# Patient Record
Sex: MALE | Race: ASIAN | NOT HISPANIC OR LATINO | Employment: FULL TIME | URBAN - METROPOLITAN AREA
[De-identification: names, ages, dates, MRNs, and addresses within clinical notes are randomized per-mention and may not be internally consistent; named-entity substitution may affect disease eponyms.]

---

## 2017-07-28 ENCOUNTER — OFFICE VISIT - HEALTHEAST (OUTPATIENT)
Dept: FAMILY MEDICINE | Facility: CLINIC | Age: 47
End: 2017-07-28

## 2017-07-28 ENCOUNTER — COMMUNICATION - HEALTHEAST (OUTPATIENT)
Dept: TELEHEALTH | Facility: CLINIC | Age: 47
End: 2017-07-28

## 2017-07-28 DIAGNOSIS — M54.50 LOW BACK PAIN: ICD-10-CM

## 2017-07-28 DIAGNOSIS — R05.9 COUGH: ICD-10-CM

## 2017-07-28 ASSESSMENT — MIFFLIN-ST. JEOR: SCORE: 1522.64

## 2018-01-05 ENCOUNTER — OFFICE VISIT - HEALTHEAST (OUTPATIENT)
Dept: FAMILY MEDICINE | Facility: CLINIC | Age: 48
End: 2018-01-05

## 2018-01-05 DIAGNOSIS — Z23 NEED FOR INFLUENZA VACCINATION: ICD-10-CM

## 2018-01-05 DIAGNOSIS — R05.3 CHRONIC COUGH: ICD-10-CM

## 2019-08-05 ENCOUNTER — OFFICE VISIT (OUTPATIENT)
Dept: FAMILY MEDICINE | Facility: CLINIC | Age: 49
End: 2019-08-05
Payer: COMMERCIAL

## 2019-08-05 VITALS
HEART RATE: 64 BPM | TEMPERATURE: 98.1 F | WEIGHT: 176 LBS | SYSTOLIC BLOOD PRESSURE: 128 MMHG | OXYGEN SATURATION: 94 % | HEIGHT: 63 IN | DIASTOLIC BLOOD PRESSURE: 84 MMHG | BODY MASS INDEX: 31.18 KG/M2 | RESPIRATION RATE: 18 BRPM

## 2019-08-05 DIAGNOSIS — Z83.3 FAMILY HISTORY OF DIABETES MELLITUS: ICD-10-CM

## 2019-08-05 DIAGNOSIS — R05.3 CHRONIC COUGH: Primary | ICD-10-CM

## 2019-08-05 LAB
FASTING?: NO
GLUCOSE SERPL-MCNC: 76 MG/DL (ref 70–125)

## 2019-08-05 SDOH — HEALTH STABILITY: MENTAL HEALTH: HOW OFTEN DO YOU HAVE A DRINK CONTAINING ALCOHOL?: NEVER

## 2019-08-05 ASSESSMENT — MIFFLIN-ST. JEOR: SCORE: 1568.46

## 2019-08-05 NOTE — PROGRESS NOTES
"[facility]         SUBJECTIVE       Felicia Greco is a 49 year old male presenting to clinic today with a chief complaint of cough. Patient has had 6 months of cough. At time he has productive sputum with white sputum sometimes the cough is very dry.  His coughing is worse after big meals.  He notes that it is worse after dinner every day.  He recently started having a small amount of emesis following big meals associated with a coughing spell.  He was seen back in January 2018 for similar symptoms.  He was given a short course of amantadine to try.  Patient only took about 10 days of the medication before discontinuing it and he has been lost to follow-up since that time.  He does have a history of latent TB for which she has been on medications for in the past.  He is new to our clinic today and we have limited records.  Overall the patient does not feel ill or short of breath.  He has had no fevers and denies any congestion-like symptoms.  He has no runny nose.    PMH, Medications and Allergies were reviewed and updated as needed.    ROS:  General: No fevers, chills  Head: Sometimes has headaches not worse than typical.  CV: No chest pain or palpitations.   Resp: No shortness of breath.  No cough. No hemoptysis.  GI: Positive vomiting after coughing spell. No Constipation, diarrhea  : No urinary pains    There is no problem list on file for this patient.      Current Outpatient Medications   Medication Sig Dispense Refill     ranitidine (ZANTAC) 150 MG tablet Take 1 tablet (150 mg) by mouth 2 times daily 120 tablet 11            OBJECTIVE:       Vitals:   Vitals:    08/05/19 1114   BP: 128/84   BP Location: Left arm   Patient Position: Sitting   Cuff Size: Adult Large   Pulse: 64   Resp: 18   Temp: 98.1  F (36.7  C)   TempSrc: Oral   SpO2: 94%   Weight: 79.8 kg (176 lb)   Height: 1.6 m (5' 3\")     BMI: Body mass index is 31.18 kg/m .    Gen:  Well nourished and in no acute distress. Dry cough on " exam.  HEENT: Extraocular movement intact.  Neck: supple without lymphadenopathy  CV:  RRR  - no murmurs noted   Pulm:  CTAB, no wheezes or crackles noted, good air entry   ABD: soft, nontender, no masses, no rebound, BS intact throughout, no hepatosplenomegaly  Extrem: no cyanosis, edema or clubbing  Psych: Euthymic           ASSESSMENT and PLAN:     Patient has very limited primary care in the past.  He notes 6 months of dry cough that is worse with eating and worse when lying flat.  Patient symptoms do sound somewhat consistent with GERD.  He was seen back by a provider in January 2018 for similar symptoms.  He was given a short course of ranitidine that he only took for 10 days.  He did not think it helped his symptoms however he did not take the medication very long.  We discussed the importance of trying to take this medication again and taking it for 1 to 2 months.  He should see us first before discontinuing the medication.  Stressed the importance of following up as he has been lost to follow-up several times in the past.  He does have a history of a positive tuberculosis skin test in the past with a normal chest x-ray.  He had received therapy per his report however we only have limited records as this is the first time we are seeing him.  We will get a chest x-ray given that there is records back from 2018 noting that the patient had similar cough at that time.  We will discuss further treatment at next follow-up visit should patient's symptoms fail to improve we will call regarding the results.    1. Chronic cough  - XR CHEST 2 VW  - ranitidine (ZANTAC) 150 MG tablet; Take 1 tablet (150 mg) by mouth 2 times daily  Dispense: 120 tablet; Refill: 11    2. Family history of diabetes mellitus: Patient is concerned about the possibility of diabetes.  Mom was diagnosed at diabetes around his age.  He denies any associated symptoms at this time however will obtain a glucose today.  I did stress the importance of  having a complete physical done with the patient as he has not had one done in the recent past.  He plans to schedule that either with his 1 month follow-up or after that visit.  - Glucose ([facility])    Return to clinic in 1-2 months for follow up. Return sooner if develops new or worsening symptoms.    Options for treatment and/or follow-up care were reviewed with the patient was actively involved in the decision making process. Patient verbalized understanding and was in agreement with the plan.    The patient was seen by and discussed with MD Daphne Evans,  PGY 2  [facility] [place]  [phone]

## 2019-08-05 NOTE — PROGRESS NOTES
Preceptor Attestation:   Patient seen, evaluated and discussed with the resident. I have verified the content of the note, which accurately reflects my assessment of the patient and the plan of care.   Supervising Physician:  Chris Gonzalez MD

## 2019-08-05 NOTE — PATIENT INSTRUCTIONS
1) Please take ranitidine 2 times daily before meals  2) come back to clinic in 1 -2 months for reevaluations  3) Have Chest Xray done  4) Will call regarding chest xray and lab results  5) Need to have a complete physical done    Thank you, Daphne Schmitt PG3

## 2019-08-05 NOTE — NURSING NOTE
Due to patient being non-English speaking/uses sign language, an  was used for this visit. Only for face-to-face interpretation by an external agency, date and length of interpretation can be found on the scanned worksheet.     name: Fernanda Remy  Agency: [employer]  Language: Ora  Telephone number: [phone]  Type of interpretation: Face-to-face, spoken

## 2019-08-09 NOTE — RESULT ENCOUNTER NOTE
Annette Simmons,    Please call the following patient with the results below. Thank you!    Annette Duarte,    I hope you're well. I wanted to communicate with you the results of the tests that we did.     Which is good news! The laboratory results show no elevation of your glucose which is reassuring that you do not have diabetes.  Additionally chest x-ray performed does not show any evidence of pneumonia and it does not show any lesions that look like they could possibly be tuberculosis.  If symptoms should continue please return to clinic.  Please let me know if you have any other questions or concerns.     Thank you!  Daphne Schmitt, PGY2

## 2019-10-30 ENCOUNTER — OFFICE VISIT - HEALTHEAST (OUTPATIENT)
Dept: FAMILY MEDICINE | Facility: CLINIC | Age: 49
End: 2019-10-30

## 2019-10-30 DIAGNOSIS — J06.9 VIRAL URI WITH COUGH: ICD-10-CM

## 2019-10-30 DIAGNOSIS — J20.9 ACUTE BRONCHITIS, UNSPECIFIED ORGANISM: ICD-10-CM

## 2019-10-30 DIAGNOSIS — J02.9 SORE THROAT: ICD-10-CM

## 2019-10-30 LAB — DEPRECATED S PYO AG THROAT QL EIA: NORMAL

## 2019-10-30 ASSESSMENT — MIFFLIN-ST. JEOR: SCORE: 1594.74

## 2019-10-31 LAB — GROUP A STREP BY PCR: NORMAL

## 2020-05-05 ENCOUNTER — COMMUNICATION - HEALTHEAST (OUTPATIENT)
Dept: SCHEDULING | Facility: CLINIC | Age: 50
End: 2020-05-05

## 2021-05-31 VITALS — BODY MASS INDEX: 29.59 KG/M2 | HEIGHT: 63 IN | WEIGHT: 167 LBS

## 2021-05-31 VITALS — BODY MASS INDEX: 31.35 KG/M2 | WEIGHT: 177 LBS

## 2021-06-02 NOTE — PROGRESS NOTES
"Assessment/ Plan  1. Viral URI with cough-though duration at 2 weeks is now prolonged  Discussed likelihood that this illness has an infectious cause that is viral and would not be responsive to antibiotic.  Discussed options for symptomatic treatment  Azithromycin printed and prescribed in case things do not improve over the next 3 to 5 days    Follow-up after treatment if not improving        Subjective    Chief Complaint   Patient presents with     Cough     Sore Throat       HPI  Upper Respiratory infection  Duration 2week(s)      Worst Symptoms: cough, sore throat and hoarse voice  Runny nose?  Yes  Sore throat?  Yes  Cough/ productive or dry?  Yes: productive  Fever?  No  HA/ achiness?  Yes: mild  Symptoms at start of illness : same  Any medications?  Yes: Tylenol      Current Outpatient Medications on File Prior to Visit   Medication Sig     ranitidine (ZANTAC) 150 MG tablet Take 1 tablet (150 mg total) by mouth 2 (two) times a day.     No current facility-administered medications on file prior to visit.      There is no problem list on file for this patient.    Past Surgical History:   Procedure Laterality Date     APPENDECTOMY       No family history on file.    ROS  As listed above     Objective  Physical Exam  Vitals:    10/30/19 0842   BP: 125/87   Patient Site: Left Arm   Patient Position: Sitting   Cuff Size: Adult Large   Pulse: 76   Resp: 18   Temp: 98.4  F (36.9  C)   TempSrc: Oral   Weight: 180 lb 8 oz (81.9 kg)   Height: 5' 4\" (1.626 m)     Patient is alert, oriented and in no distress.    Conjunctiva, lids appear normal.  Nares are normal bilaterally.    TMs are visualized bilaterally and appear normal    There is no adenopathy in the neck.  Oral cavity is without any notable lesion,   oropharynx appears normal without any erythema, exudate, petechia    Chest appears normal,   auscultation reveals :  normal breath sounds,   no wheezing,  no rales   no rhonchi.        Please note: Voice " recognition software was used in this dictation.  It may therefore contain typographical errors.

## 2021-06-03 VITALS
HEIGHT: 64 IN | TEMPERATURE: 98.4 F | WEIGHT: 180.5 LBS | RESPIRATION RATE: 18 BRPM | BODY MASS INDEX: 30.81 KG/M2 | SYSTOLIC BLOOD PRESSURE: 125 MMHG | HEART RATE: 76 BPM | DIASTOLIC BLOOD PRESSURE: 87 MMHG

## 2021-06-07 NOTE — TELEPHONE ENCOUNTER
"Pt states \"Two days ago, strong pain in R side of my stomach.\"  \"Last night very bad pain again.\"  \"This morning, still pain but not so strong.\"  Able to stand up this morning -> could not stand upright last night.  Lower R side.    No vomiting.  No fevers.    Advised immediate ED eval.  Pt agrees -> intends to go to [facility].    Jeannine Blanco RN  [facility] Triage     Reason for Disposition    Constant abdominal pain lasting > 2 hours    Protocols used: ABDOMINAL PAIN - MALE-A-OH    Note -> No suspicion of coronavirus.  Nevertheless provided precautionary measures per current protocols:  COVID 19 Nurse Triage Plan/Patient Instructions    Please be aware that novel coronavirus (COVID-19) may be circulating in the community. If you develop symptoms such as fever, cough, or SOB or if you have concerns about the presence of another infection including coronavirus (COVID-19), please contact your health care provider or visit [email or web address].     Disposition/Instructions    Additional COVID19 information to add for patients.     Additional General Information About COVID-19    COVID-19 - General Information:  Regardless of if you have been tested or not:  Patient who have symptoms (cough, fever, or shortness of breath), need to isolate for 7 days from when symptoms started AND 72 hours after fever resolves (without fever reducing medications) AND improvement of respiratory symptoms (whichever is longer).      Isolate yourself at home (in own room/own bathroom if possible)    Do Not allow any visitors    Do Not go to work or school    Do Not go to [employer],  centers, shopping, or other public places.    Do Not shake hands.    Avoid close and intimate contact with others (hugging, kissing).    Follow CDC recommendations for household cleaning of frequently touched services.     After the initial 7 days, continue to isolate yourself from household members as much as possible. To continue decrease the " risk of community spread and exposure, you and any members of your household should limit activities in public for 14 days after starting home isolation.     You can reference the following CDC link for helpful home isolation/care tips:  https://www.cdc.gov/coronavirus/2019-ncov/downloads/10Things.pdf    COVID-19 - Symptoms:     The COVID-19 can cause a respiratory illness, such as bronchitis or pneumonia.    The most common symptoms are: cough, fever, and shortness of breath.     Other symptoms are: body aches, chills, diarrhea, fatigue, headache, runny nose, and sore throat     COVID-19 - Exposure Risk Factors:    Exposure to a person who has been diagnosed with COVID-19 .    Travel from an area with recent local transmission of COVID-19 .    The CDC (www.cdc.gov) has the most up-to-date list of where the COVID-19 outbreak is occurring.    COVID-19 - Spreading:     The virus likely spreads through respiratory droplets produced when a person coughs or sneezes. These respiratory droplets can travel approximately 6 feet and can remain on surfaces.  Common disinfectants will kill the virus.    The CDC currently does not recommend healthy people wearing masks.    COVID-19 - Protect Yourself:     Avoid close contact with people known to have this new COVID-19 infection.    Wash hands often with soap and water or alcohol-based hand .    Avoid touching the eyes, nose or mouth.       Thank you for limiting contact with others, wearing a simple mask to cover your cough, practice good hand hygiene habits and accessing our virtual services where possible to limit the spread of this virus.    For more information about COVID19 and options for caring for yourself at home, please visit the CDC website at https://www.cdc.gov/coronavirus/2019-ncov/about/steps-when-sick.html  For more options for care at [facility], please visit our website at [email or web address]    For more information,  please use the [place] Department of Health COVID-19 Website: https://www.health.[facility].[place].us/diseases/coronavirus/index.html  [place] Department of Health ([facility]) COVID-19 Hotlines (Interpreters available):      Health questions: Phone Number: [phone] or [phone] and Hours: 7 a.m. to 7 p.m.    Schools and  questions: Phone Number: [phone] or [phone] and Hours 7 a.m. to 7 p.m.

## 2021-06-12 NOTE — PROGRESS NOTES
Subjective:      Felicia Greco is a 47 y.o. male who presents for evaluation of 2 concerns:    1.  Cough.  He has had a cough daily for 1 month.  He feels like symptoms are staying the same.  He feels like cough gets worse with weather changes, like going from warm air into air-conditioned air.  He coughs during the day and at nighttime.  He has tried over-the-counter cough medicine, and it has been somewhat helpful.  He does not smoke.  No fevers.  No shortness of breath.  He has no significant past history of any pulmonary issues or asthma.    2.  Right low back pain.  He has had pain in the right low back for some time.  It is in the right SI joint and then going down into the right hip.  Does not radiate down the leg.  He describes the pain as sharp.  He has not tried any medicines for the pain.  No loss of control of bladder or bowel function.    Patient speaks English as a second language.  He speaks in broken English today.  He did not want an .  Communication was somewhat difficult.  He was also somewhat poor historian.  He denies any other significant medical history, other than appendectomy.  No prescription medications.    There is no problem list on file for this patient.      Current Outpatient Prescriptions:      azithromycin (ZITHROMAX Z-HUNTER) 250 MG tablet, Take 2 tablets (500 mg) on  Day 1,  Then take 1 tablet (250 mg) once daily on Days 2 through 5., Disp: 6 tablet, Rfl: 0     ibuprofen (ADVIL,MOTRIN) 400 MG tablet, Take 1 tablet (400 mg total) by mouth every 8 (eight) hours as needed for pain. Take with food., Disp: 60 tablet, Rfl: 0     Objective:     No Known Allergies  Vitals:    07/28/17 1109   BP: 114/72   Pulse: 71   Resp: 20   Temp: 98.4  F (36.9  C)   SpO2: 95%     Body mass index is 29.58 kg/(m^2).    Vitals reviewed as above.  General: Patient is alert and oriented x 3, in no apparent distress  Ears: TMs are nonerythematous with good light reflex bilaterally  Throat: No erythema  edema or exudate noted  Lymphatic: No anterior cervical lymph node enlargement  Cardiac: Regular rate and rhythm, no murmurs  Pulmonary: lungs clear to ausculation, no crackles, rales, rhonchi, or wheezing  Musculoskeletal: normal 4/5 strength in major muscle groups in lower extremities bilaterally, normal foot strength, negative straight leg raise test bilaterally, patient walks in a normal tandem gait, main area of pain is the right SI joint, no very minimal pain with palpation there, no pain with palpation of the right hip joint.  When flexing at the waist, patient can flex to about 90 , but that does trigger pain in the right low back  Skin: Skin in the affected area of the low back is unaffected and healthy    Assessment and Plan:     1.  Cough.  He has had a cough for 1 month.  He does not feel like it is getting better.  No other concerning signs or symptoms.  Prescription sent for azithromycin ×5 days.  If this does not resolve his symptoms, he is asked to return for follow-up.    2.  Low back pain, likely musculoskeletal in origin.  No red flags on exam or history.  I reviewed symptomatic treatment.  He can take Tylenol.  I sent a prescription for ibuprofen for him.  I reviewed proper use with him.  If any symptoms are worsening he will contact the clinic.    This dictation uses voice recognition software, which may contain typographical errors.

## 2021-06-15 NOTE — PROGRESS NOTES
Subjective:  47 y.o. male with concerns of tonic cough.  Has lasted now for 1 month.  He does come and go.  He was last evaluated for this in July 2017.  He took azithromycin then which did not help.  He does not feel ill or short of breath.  He has had no febrile symptoms.  He has no lower extremity edema or chest pain.    He reports he had a normal chest x-ray after a positive tuberculosis skin test.  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  He reports that the chest x-ray was clear.    He denies any runny nose or sinus congestion.  He does feel better if he has taking a warm shower.  He does report a bit of globus sensation.  He does not think he has any heartburn symptoms.  He does not smoke or use alcohol.    Surgical history significant for appendectomy in [facility].    He is originally from [place].  His ethnicity is [place].    Outpatient Medications Prior to Visit   Medication Sig Dispense Refill     azithromycin (ZITHROMAX Z-HUNTER) 250 MG tablet Take 2 tablets (500 mg) on  Day 1,  Then take 1 tablet (250 mg) once daily on Days 2 through 5. 6 tablet 0     No facility-administered medications prior to visit.       History   Smoking Status     Never Smoker   Smokeless Tobacco     Never Used      Objective:  /64 (Patient Site: Left Arm, Patient Position: Sitting, Cuff Size: Adult Regular)  Pulse 90  Temp 98  F (36.7  C) (Oral)   Resp 20  Wt 177 lb (80.3 kg)  SpO2 97%  BMI 31.35 kg/m2  GENERAL: alert, not distressed  EYES: PERRL/EOMI, no scleral icterus, no conjunctival injection  EARS: normal tympanic membranes and external auditory canals bilaterally  PHARYNX: no erythema or exudates  MOUTH: well hydrated mucosa, no lesions  NECK: no lymphadenopathy or thyroid nodules  CHEST: clear, no rales, rhonchi, or wheezes  CARDIAC: regular without murmur  ABDOMEN: soft, non tender, non distended, normal bowel sounds    Assessment and Plan:  1. Chronic cough  Discussed differential of chronic cough to  include postnasal drip, gastroesophageal reflux, cough variant asthma, and eosinophilic bronchitis.  Mild obesity makes him at risk for reflux.  We discussed a trial of an antihistamine acid blocker for about a month.  He is to return here in that amount of time.  If he does not improve I would recommend a repeat chest x-ray.    - ranitidine (ZANTAC) 150 MG tablet; Take 1 tablet (150 mg total) by mouth 2 (two) times a day.  Dispense: 60 tablet; Refill: 1    2. Need for influenza vaccination  - Influenza, Seasonal Quad, Preservative Free 36+ Months

## 2022-01-26 ENCOUNTER — IMMUNIZATION (OUTPATIENT)
Dept: NURSING | Facility: CLINIC | Age: 52
End: 2022-01-26
Payer: COMMERCIAL

## 2022-01-26 PROCEDURE — 0054A COVID-19,PF,PFIZER (12+ YRS): CPT

## 2022-01-26 PROCEDURE — 91305 PR COVID VAC PFIZER TRIS-SUCROSE 30MCG/0.3ML IM: CPT
